# Patient Record
Sex: MALE | HISPANIC OR LATINO | Employment: OTHER | URBAN - NONMETROPOLITAN AREA
[De-identification: names, ages, dates, MRNs, and addresses within clinical notes are randomized per-mention and may not be internally consistent; named-entity substitution may affect disease eponyms.]

---

## 2022-03-21 ENCOUNTER — APPOINTMENT (OUTPATIENT)
Dept: GENERAL RADIOLOGY | Facility: HOSPITAL | Age: 42
End: 2022-03-21

## 2022-03-21 ENCOUNTER — HOSPITAL ENCOUNTER (EMERGENCY)
Facility: HOSPITAL | Age: 42
Discharge: HOME | End: 2022-03-21
Attending: EMERGENCY MEDICINE | Admitting: EMERGENCY MEDICINE

## 2022-03-21 VITALS
BODY MASS INDEX: 29.62 KG/M2 | TEMPERATURE: 98.6 F | SYSTOLIC BLOOD PRESSURE: 128 MMHG | OXYGEN SATURATION: 94 % | DIASTOLIC BLOOD PRESSURE: 82 MMHG | RESPIRATION RATE: 18 BRPM | WEIGHT: 200 LBS | HEART RATE: 88 BPM | HEIGHT: 69 IN

## 2022-03-21 DIAGNOSIS — T40.1X1A ACCIDENTAL OVERDOSE OF HEROIN, INITIAL ENCOUNTER: Primary | ICD-10-CM

## 2022-03-21 DIAGNOSIS — I48.20 CHRONIC ATRIAL FIBRILLATION: ICD-10-CM

## 2022-03-21 LAB
ALBUMIN SERPL-MCNC: 4.2 G/DL (ref 3.5–5.2)
ALBUMIN/GLOB SERPL: 1.4 G/DL
ALP SERPL-CCNC: 158 U/L (ref 39–117)
ALT SERPL W P-5'-P-CCNC: 51 U/L (ref 1–41)
ANION GAP SERPL CALCULATED.3IONS-SCNC: 9 MMOL/L (ref 5–15)
APAP SERPL-MCNC: <5 MCG/ML (ref 0–30)
AST SERPL-CCNC: 41 U/L (ref 1–40)
BASOPHILS # BLD AUTO: 0.04 10*3/MM3 (ref 0–0.2)
BASOPHILS NFR BLD AUTO: 0.4 % (ref 0–1.5)
BILIRUB SERPL-MCNC: 0.2 MG/DL (ref 0–1.2)
BUN SERPL-MCNC: 15 MG/DL (ref 6–20)
BUN/CREAT SERPL: 20.5 (ref 7–25)
CALCIUM SPEC-SCNC: 9.3 MG/DL (ref 8.6–10.5)
CHLORIDE SERPL-SCNC: 100 MMOL/L (ref 98–107)
CO2 SERPL-SCNC: 30 MMOL/L (ref 22–29)
CREAT SERPL-MCNC: 0.73 MG/DL (ref 0.76–1.27)
DEPRECATED RDW RBC AUTO: 43.8 FL (ref 37–54)
EGFRCR SERPLBLD CKD-EPI 2021: 118.7 ML/MIN/1.73
EOSINOPHIL # BLD AUTO: 0.17 10*3/MM3 (ref 0–0.4)
EOSINOPHIL NFR BLD AUTO: 1.8 % (ref 0.3–6.2)
ERYTHROCYTE [DISTWIDTH] IN BLOOD BY AUTOMATED COUNT: 13.4 % (ref 12.3–15.4)
ETHANOL UR QL: <0.01 %
GLOBULIN UR ELPH-MCNC: 3 GM/DL
GLUCOSE SERPL-MCNC: 108 MG/DL (ref 65–99)
HCT VFR BLD AUTO: 41.4 % (ref 37.5–51)
HGB BLD-MCNC: 12.8 G/DL (ref 13–17.7)
IMM GRANULOCYTES # BLD AUTO: 0.03 10*3/MM3 (ref 0–0.05)
IMM GRANULOCYTES NFR BLD AUTO: 0.3 % (ref 0–0.5)
LYMPHOCYTES # BLD AUTO: 3.24 10*3/MM3 (ref 0.7–3.1)
LYMPHOCYTES NFR BLD AUTO: 35.1 % (ref 19.6–45.3)
MCH RBC QN AUTO: 27.9 PG (ref 26.6–33)
MCHC RBC AUTO-ENTMCNC: 30.9 G/DL (ref 31.5–35.7)
MCV RBC AUTO: 90.2 FL (ref 79–97)
MONOCYTES # BLD AUTO: 0.73 10*3/MM3 (ref 0.1–0.9)
MONOCYTES NFR BLD AUTO: 7.9 % (ref 5–12)
NEUTROPHILS NFR BLD AUTO: 5.03 10*3/MM3 (ref 1.7–7)
NEUTROPHILS NFR BLD AUTO: 54.5 % (ref 42.7–76)
NRBC BLD AUTO-RTO: 0 /100 WBC (ref 0–0.2)
NT-PROBNP SERPL-MCNC: 34.2 PG/ML (ref 0–450)
PLATELET # BLD AUTO: 245 10*3/MM3 (ref 140–450)
PMV BLD AUTO: 10.2 FL (ref 6–12)
POTASSIUM SERPL-SCNC: 3.7 MMOL/L (ref 3.5–5.2)
PROT SERPL-MCNC: 7.2 G/DL (ref 6–8.5)
RBC # BLD AUTO: 4.59 10*6/MM3 (ref 4.14–5.8)
SALICYLATES SERPL-MCNC: <0.3 MG/DL
SODIUM SERPL-SCNC: 139 MMOL/L (ref 136–145)
TROPONIN T SERPL-MCNC: <0.01 NG/ML (ref 0–0.03)
WBC NRBC COR # BLD: 9.24 10*3/MM3 (ref 3.4–10.8)

## 2022-03-21 PROCEDURE — 25010000002 NALOXONE PER 1 MG: Performed by: EMERGENCY MEDICINE

## 2022-03-21 PROCEDURE — 84484 ASSAY OF TROPONIN QUANT: CPT | Performed by: EMERGENCY MEDICINE

## 2022-03-21 PROCEDURE — 93010 ELECTROCARDIOGRAM REPORT: CPT | Performed by: INTERNAL MEDICINE

## 2022-03-21 PROCEDURE — 71045 X-RAY EXAM CHEST 1 VIEW: CPT

## 2022-03-21 PROCEDURE — 99284 EMERGENCY DEPT VISIT MOD MDM: CPT

## 2022-03-21 PROCEDURE — 96374 THER/PROPH/DIAG INJ IV PUSH: CPT

## 2022-03-21 PROCEDURE — 80179 DRUG ASSAY SALICYLATE: CPT | Performed by: EMERGENCY MEDICINE

## 2022-03-21 PROCEDURE — 93005 ELECTROCARDIOGRAM TRACING: CPT | Performed by: EMERGENCY MEDICINE

## 2022-03-21 PROCEDURE — 96375 TX/PRO/DX INJ NEW DRUG ADDON: CPT

## 2022-03-21 PROCEDURE — 80053 COMPREHEN METABOLIC PANEL: CPT | Performed by: EMERGENCY MEDICINE

## 2022-03-21 PROCEDURE — 85025 COMPLETE CBC W/AUTO DIFF WBC: CPT | Performed by: EMERGENCY MEDICINE

## 2022-03-21 PROCEDURE — 80143 DRUG ASSAY ACETAMINOPHEN: CPT | Performed by: EMERGENCY MEDICINE

## 2022-03-21 PROCEDURE — 83880 ASSAY OF NATRIURETIC PEPTIDE: CPT | Performed by: EMERGENCY MEDICINE

## 2022-03-21 PROCEDURE — 82077 ASSAY SPEC XCP UR&BREATH IA: CPT | Performed by: EMERGENCY MEDICINE

## 2022-03-21 RX ORDER — SODIUM CHLORIDE 0.9 % (FLUSH) 0.9 %
10 SYRINGE (ML) INJECTION AS NEEDED
Status: DISCONTINUED | OUTPATIENT
Start: 2022-03-21 | End: 2022-03-22 | Stop reason: HOSPADM

## 2022-03-21 RX ORDER — METOPROLOL TARTRATE 5 MG/5ML
5 INJECTION INTRAVENOUS ONCE
Status: COMPLETED | OUTPATIENT
Start: 2022-03-21 | End: 2022-03-21

## 2022-03-21 RX ORDER — NALOXONE HCL 0.4 MG/ML
1 VIAL (ML) INJECTION ONCE
Status: COMPLETED | OUTPATIENT
Start: 2022-03-21 | End: 2022-03-21

## 2022-03-21 RX ORDER — NALOXONE HYDROCHLORIDE 4 MG/.1ML
1 SPRAY NASAL AS NEEDED
Status: DISCONTINUED | OUTPATIENT
Start: 2022-03-21 | End: 2022-03-22 | Stop reason: HOSPADM

## 2022-03-21 RX ADMIN — NALOXONE HYDROCHLORIDE 1 SPRAY: 4 SPRAY NASAL at 23:51

## 2022-03-21 RX ADMIN — NALOXONE HYDROCHLORIDE 1 MG: 0.4 INJECTION, SOLUTION INTRAMUSCULAR; INTRAVENOUS; SUBCUTANEOUS at 21:53

## 2022-03-21 RX ADMIN — METOPROLOL TARTRATE 5 MG: 1 INJECTION, SOLUTION INTRAVENOUS at 21:43

## 2022-03-21 RX ADMIN — SODIUM CHLORIDE, POTASSIUM CHLORIDE, SODIUM LACTATE AND CALCIUM CHLORIDE 1000 ML: 600; 310; 30; 20 INJECTION, SOLUTION INTRAVENOUS at 21:14

## 2022-03-22 LAB
QT INTERVAL: 318 MS
QTC INTERVAL: 449 MS

## 2022-03-22 NOTE — DISCHARGE INSTRUCTIONS
You were evaluated in the ER for an overdose. Your workup showed no indication at this time for admission to the hospital. Please use your home medications for symptomatic improvement. YOU NEED TO STOP USING HEROIN. PLEASE HAVE A NARCAN KIT AVAILABLE WITH YOU. PLEASE FOLLOW UP WITH YOUR PCP AND A CARDIOLOGIST FOR YOUR INCIDENTAL FINDING OF AFIB.    It is VERY IMPORTANT that you follow up (call them to set up an appointment) with your primary care doctor* within the next few days or as soon as possible so that you can be re-evaluated for improvement in your symptoms or for any other questions. If you were prescribed any medications, please take them as directed or call us back with any questions.     Return to the ER within 24-48 hours if you have any new symptoms, worsening symptoms, or any other concerns.    _____  *If you do not have a primary care doctor, follow up with one of the [facility] physician groups below to setup primary care.     If you have trouble making an appointment, please call the [facility] Nurse Line at [phone]    Dr. Raine Combs DO, Dr. Ashwin Doll DO, and JAZZY Macias  [facility]  [address] [place] [address], [address] [phone]    Dr. Tato Austin MD  [facility] Internal Medicine - [place] [address], [address], [place] [address] [phone]    Dr. Jesse Sexton DO, Dr. Kayode Junior DO,  JAZZY Hernandez, and JAZZY Casey  [facility] Family & Internal Medicine - [address] [place] [address], [address], [place] [address] [phone]     Dr. Reny Adams MD, and JAZZY Hand  [facility] Family [address] [address] [phone]    Dr. Dallin Dacosta MD and Dr. Oz Storey MD  [facility] [address],  [place], [address]  [phone]    Dr. Valdez Medraon MD  [facility]  [address] [place] [address], [facility], [place], [place], [address] [phone]    Dr. Andres Flynn MD  [address], [address] [phone]

## 2022-03-22 NOTE — ED PROVIDER NOTES
Emergency Medicine Provider Note    Subjective:    HISTORY OF PRESENT ILLNESS     This is a very pleasant 41 y.o. male who denies any significant past medical history who presents to the emergency department today with a chief complaint of heroin overdose.  Patient states that approximately 830 he took some heroin.  Per reports the people who live near him administered Narcan and did bystander CPR.  Per EMS arrival he was awake and alert with no complaints.          History is obtained from EMS and the patient.       Review of Systems: All other systems are reviewed and are negative other than noted in the HPI.    Past Medical History:  History reviewed. No pertinent past medical history.    Allergies:  No Known Allergies    Past Surgical History:  History reviewed. No pertinent surgical history.    Family History:  History reviewed. No pertinent family history.    Social History:  Social History     Socioeconomic History   • Marital status:        Home Medications:  Prior to Admission medications    Not on File         Objective:    PHYSICAL EXAM     Vitals:   Vitals:    03/21/22 2346   BP: 128/82   Pulse:    Resp:    Temp:    SpO2:      GENERAL: Well appearing, in no acute distress.   HEENT: Moist mucous membranes, oropharynx clear without lesions, exudates, thrush.   EYES: No scleral icterus, conjunctivae clear.   NECK: No cervical lymphadenopathy, no stiffness.  CARDIAC: Tachycardic, irregular rhythm, no murmurs, 2+ peripheral pulses in all four extremities, normal capillary refill.   PULMONARY: Normal work of breathing on room air, lungs are clear to auscultation bilaterally without wheezes, crackles, rhonchi.  ABDOMINAL: Normal bowel sounds, abdomen is soft, non-tender, non-distended, no hepatomegaly or splenomegaly.   MUSCULOSKELETAL: Normal range of motion, no lower extremity edema.  NEUROLOGIC: Alert and oriented x 3, EOM grossly intact and moves all four extremities with normal strength.  SKIN:  Warm and dry without rashes.   PSYCHIATRIC: Mood and affect are normal.     PROCEDURES     Procedures    LAB AND RADIOLOGY RESULTS     Lab Results (last 24 hours)     ** No results found for the last 24 hours. **          XR Chest 1 View    Result Date: 3/21/2022  Narrative: EXAM: XR CHEST 1 VW-  INDICATION: overdose; T40.1X1A-Poisoning by heroin, accidental (unintentional), initial encounter  COMPARISON: None available.  FINDINGS:  Cardiac silhouette is normal size. No pleural effusion, pneumothorax, or focal consolidation. No acute osseous finding.      Impression:  No acute findings. This report was finalized on 03/21/2022 21:29 by Dr. Greg Burns MD.      ED course:    Medications   lactated ringers bolus 1,000 mL (0 mL Intravenous Stopped 3/21/22 2146)   metoprolol tartrate (LOPRESSOR) injection 5 mg (5 mg Intravenous Given 3/21/22 2143)   naloxone (NARCAN) injection 1 mg (1 mg Intravenous Given 3/21/22 2153)     ED Course as of 03/24/22 2249   Mon Mar 21, 2022   2149 Patient now dozing off.  Will give more Narcan. [CF]   2232 Assumed care of this patient from Dr. Iraheta pending reassessment and disposition. [NP]      ED Course User Index  [CF] Zenon Iraheta MD  [NP] Bela Salas MD       Amount and/or complexity of data reviewed:    • Clinical lab tests ordered and reviewed.  • Tests in the radiology section ordered and reviewed.  • Independent visualization of imaging, tracing, or specimen is remarkable for an EKG with atrial fibrillation with rapid ventricular response at a rate of 120, no ST elevation or depression concerning for acute ischemia.        Risk of significant complications, morbidity, and/or mortality.    •  Presenting problem: high  •  Diagnostic procedures: moderate  •  Management options: high        MEDICAL DECISION MAKING     Patient presents with heroin overdose. Upon arrival to the Emergency Department the patient is in no acute distress, vital signs are reassuring.   He has no complaints.  IV access is obtained and labs are sent.  He is evaluated with a chest x-ray and EKG. x-ray and lab work is reassuring.  However, patient does become sleepy and requires repeat Narcan dosing.  He is also noted to be in atrial fibrillation.  I have made him aware of this.  He has been signed out to the oncoming provider for observation.    Dr. Salas: Patient not requiring oxygen. He is not hypoxic and does not desaturate when he sleeps anymore. Will discharge him in stable condition.     Diagnosis:    Final diagnoses:   Accidental overdose of heroin, initial encounter (HCC)   Chronic atrial fibrillation (HCC)         ED Disposition:     ED Disposition     ED Disposition   Discharge    Condition   Stable    Comment   --             Provider, No Known  [facility] [address]  [phone]    Call in 1 day  As needed, If symptoms worsen    [facility] CARDIOLOGY  [address] [place] [id number] [phone]  Call in 1 day  As needed, If symptoms worsen         Medication List      No changes were made to your prescriptions during this visit.              Zenon Iraheta MD  03/24/22 2974